# Patient Record
Sex: FEMALE | Race: BLACK OR AFRICAN AMERICAN | NOT HISPANIC OR LATINO | Employment: FULL TIME | URBAN - METROPOLITAN AREA
[De-identification: names, ages, dates, MRNs, and addresses within clinical notes are randomized per-mention and may not be internally consistent; named-entity substitution may affect disease eponyms.]

---

## 2023-06-14 ENCOUNTER — APPOINTMENT (OUTPATIENT)
Dept: PRIMARY CARE | Facility: CLINIC | Age: 28
End: 2023-06-14
Payer: COMMERCIAL

## 2023-06-16 ENCOUNTER — APPOINTMENT (OUTPATIENT)
Dept: PRIMARY CARE | Facility: CLINIC | Age: 28
End: 2023-06-16
Payer: COMMERCIAL

## 2023-06-16 RX ORDER — NAPROXEN SODIUM 220 MG/1
81 TABLET, FILM COATED ORAL DAILY
COMMUNITY
Start: 2020-09-04

## 2023-06-22 PROBLEM — O99.013 ANEMIA AFFECTING PREGNANCY IN THIRD TRIMESTER (HHS-HCC): Status: ACTIVE | Noted: 2023-06-22

## 2023-06-22 PROBLEM — O14.90 PRE-ECLAMPSIA (HHS-HCC): Status: ACTIVE | Noted: 2023-06-22

## 2023-06-22 PROBLEM — O09.43 HIGH RISK MULTIGRAVIDA IN THIRD TRIMESTER (HHS-HCC): Status: ACTIVE | Noted: 2023-06-22

## 2023-06-22 PROBLEM — O23.41 URINARY TRACT INFECTION IN MOTHER DURING FIRST TRIMESTER OF PREGNANCY (HHS-HCC): Status: ACTIVE | Noted: 2023-06-22

## 2023-06-22 PROBLEM — M25.569 KNEE PAIN: Status: ACTIVE | Noted: 2023-06-22

## 2023-06-22 PROBLEM — O35.9XX0 SUSPECTED FETAL ANOMALY, ANTEPARTUM (HHS-HCC): Status: ACTIVE | Noted: 2023-06-22

## 2023-06-22 PROBLEM — R87.810 ASCUS WITH POSITIVE HIGH RISK HPV CERVICAL: Status: ACTIVE | Noted: 2023-06-22

## 2023-06-22 PROBLEM — R87.610 ASCUS WITH POSITIVE HIGH RISK HPV CERVICAL: Status: ACTIVE | Noted: 2023-06-22

## 2023-10-06 ENCOUNTER — APPOINTMENT (OUTPATIENT)
Dept: PRIMARY CARE | Facility: CLINIC | Age: 28
End: 2023-10-06
Payer: COMMERCIAL

## 2024-04-26 ENCOUNTER — APPOINTMENT (OUTPATIENT)
Dept: PRIMARY CARE | Facility: CLINIC | Age: 29
End: 2024-04-26
Payer: COMMERCIAL

## 2024-04-30 ENCOUNTER — OFFICE VISIT (OUTPATIENT)
Dept: PRIMARY CARE | Facility: CLINIC | Age: 29
End: 2024-04-30
Payer: COMMERCIAL

## 2024-04-30 VITALS
HEIGHT: 67 IN | SYSTOLIC BLOOD PRESSURE: 100 MMHG | DIASTOLIC BLOOD PRESSURE: 60 MMHG | BODY MASS INDEX: 18.83 KG/M2 | OXYGEN SATURATION: 99 % | HEART RATE: 79 BPM | WEIGHT: 120 LBS

## 2024-04-30 DIAGNOSIS — R53.83 FATIGUE, UNSPECIFIED TYPE: Primary | ICD-10-CM

## 2024-04-30 DIAGNOSIS — D50.9 IRON DEFICIENCY ANEMIA, UNSPECIFIED IRON DEFICIENCY ANEMIA TYPE: ICD-10-CM

## 2024-04-30 PROBLEM — O09.43 HIGH RISK MULTIGRAVIDA IN THIRD TRIMESTER (HHS-HCC): Status: RESOLVED | Noted: 2023-06-22 | Resolved: 2024-04-30

## 2024-04-30 PROBLEM — O14.90 PRE-ECLAMPSIA (HHS-HCC): Status: RESOLVED | Noted: 2023-06-22 | Resolved: 2024-04-30

## 2024-04-30 PROBLEM — O23.41 URINARY TRACT INFECTION IN MOTHER DURING FIRST TRIMESTER OF PREGNANCY (HHS-HCC): Status: RESOLVED | Noted: 2023-06-22 | Resolved: 2024-04-30

## 2024-04-30 PROBLEM — O35.9XX0 SUSPECTED FETAL ANOMALY, ANTEPARTUM (HHS-HCC): Status: RESOLVED | Noted: 2023-06-22 | Resolved: 2024-04-30

## 2024-04-30 PROBLEM — O99.013 ANEMIA AFFECTING PREGNANCY IN THIRD TRIMESTER (HHS-HCC): Status: RESOLVED | Noted: 2023-06-22 | Resolved: 2024-04-30

## 2024-04-30 LAB
25(OH)D3 SERPL-MCNC: 23 NG/ML (ref 30–100)
ALBUMIN SERPL BCP-MCNC: 4.3 G/DL (ref 3.4–5)
ALP SERPL-CCNC: 61 U/L (ref 33–110)
ALT SERPL W P-5'-P-CCNC: 12 U/L (ref 7–45)
ANION GAP SERPL CALC-SCNC: 11 MMOL/L (ref 10–20)
AST SERPL W P-5'-P-CCNC: 14 U/L (ref 9–39)
BASOPHILS # BLD AUTO: 0.03 X10*3/UL (ref 0–0.1)
BASOPHILS NFR BLD AUTO: 0.3 %
BILIRUB SERPL-MCNC: 0.3 MG/DL (ref 0–1.2)
BUN SERPL-MCNC: 13 MG/DL (ref 6–23)
CALCIUM SERPL-MCNC: 9.3 MG/DL (ref 8.6–10.6)
CHLORIDE SERPL-SCNC: 104 MMOL/L (ref 98–107)
CO2 SERPL-SCNC: 26 MMOL/L (ref 21–32)
CREAT SERPL-MCNC: 0.63 MG/DL (ref 0.5–1.05)
EGFRCR SERPLBLD CKD-EPI 2021: >90 ML/MIN/1.73M*2
EOSINOPHIL # BLD AUTO: 0.14 X10*3/UL (ref 0–0.7)
EOSINOPHIL NFR BLD AUTO: 1.2 %
ERYTHROCYTE [DISTWIDTH] IN BLOOD BY AUTOMATED COUNT: 14.6 % (ref 11.5–14.5)
FERRITIN SERPL-MCNC: 100 NG/ML (ref 8–150)
GLUCOSE SERPL-MCNC: 99 MG/DL (ref 74–99)
HCT VFR BLD AUTO: 42.2 % (ref 36–46)
HGB BLD-MCNC: 13.4 G/DL (ref 12–16)
IMM GRANULOCYTES # BLD AUTO: 0.04 X10*3/UL (ref 0–0.7)
IMM GRANULOCYTES NFR BLD AUTO: 0.4 % (ref 0–0.9)
IRON SATN MFR SERPL: 33 % (ref 25–45)
IRON SERPL-MCNC: 95 UG/DL (ref 35–150)
LYMPHOCYTES # BLD AUTO: 2.53 X10*3/UL (ref 1.2–4.8)
LYMPHOCYTES NFR BLD AUTO: 22.4 %
MCH RBC QN AUTO: 27 PG (ref 26–34)
MCHC RBC AUTO-ENTMCNC: 31.8 G/DL (ref 32–36)
MCV RBC AUTO: 85 FL (ref 80–100)
MONOCYTES # BLD AUTO: 0.82 X10*3/UL (ref 0.1–1)
MONOCYTES NFR BLD AUTO: 7.3 %
NEUTROPHILS # BLD AUTO: 7.73 X10*3/UL (ref 1.2–7.7)
NEUTROPHILS NFR BLD AUTO: 68.4 %
NRBC BLD-RTO: 0 /100 WBCS (ref 0–0)
PLATELET # BLD AUTO: 340 X10*3/UL (ref 150–450)
POTASSIUM SERPL-SCNC: 4.4 MMOL/L (ref 3.5–5.3)
PROT SERPL-MCNC: 6.9 G/DL (ref 6.4–8.2)
RBC # BLD AUTO: 4.97 X10*6/UL (ref 4–5.2)
SODIUM SERPL-SCNC: 137 MMOL/L (ref 136–145)
TIBC SERPL-MCNC: 292 UG/DL (ref 240–445)
TSH SERPL-ACNC: 1.23 MIU/L (ref 0.44–3.98)
UIBC SERPL-MCNC: 197 UG/DL (ref 110–370)
VIT B12 SERPL-MCNC: 447 PG/ML (ref 211–911)
WBC # BLD AUTO: 11.3 X10*3/UL (ref 4.4–11.3)

## 2024-04-30 PROCEDURE — 85025 COMPLETE CBC W/AUTO DIFF WBC: CPT

## 2024-04-30 PROCEDURE — 82306 VITAMIN D 25 HYDROXY: CPT

## 2024-04-30 PROCEDURE — 36415 COLL VENOUS BLD VENIPUNCTURE: CPT

## 2024-04-30 PROCEDURE — 84443 ASSAY THYROID STIM HORMONE: CPT

## 2024-04-30 PROCEDURE — 80053 COMPREHEN METABOLIC PANEL: CPT

## 2024-04-30 PROCEDURE — 83550 IRON BINDING TEST: CPT

## 2024-04-30 PROCEDURE — 82728 ASSAY OF FERRITIN: CPT

## 2024-04-30 PROCEDURE — 99203 OFFICE O/P NEW LOW 30 MIN: CPT | Performed by: FAMILY MEDICINE

## 2024-04-30 PROCEDURE — 86038 ANTINUCLEAR ANTIBODIES: CPT

## 2024-04-30 PROCEDURE — 82607 VITAMIN B-12: CPT

## 2024-04-30 PROCEDURE — 83540 ASSAY OF IRON: CPT

## 2024-04-30 ASSESSMENT — ENCOUNTER SYMPTOMS
OCCASIONAL FEELINGS OF UNSTEADINESS: 0
DEPRESSION: 0
LOSS OF SENSATION IN FEET: 0

## 2024-04-30 ASSESSMENT — PATIENT HEALTH QUESTIONNAIRE - PHQ9
2. FEELING DOWN, DEPRESSED OR HOPELESS: NOT AT ALL
1. LITTLE INTEREST OR PLEASURE IN DOING THINGS: NOT AT ALL
SUM OF ALL RESPONSES TO PHQ9 QUESTIONS 1 AND 2: 0

## 2024-04-30 NOTE — PROGRESS NOTES
"Subjective   Patient ID: Jose Lainez is a 29 y.o. female who presents for New Patient Visit.    HPI   Has history of anemia during pregnancy and was told that she had an issue (not sure what) and was recommended to follow up with PCP.  Feeling foggy, tired and cold recently.  No obvious cause, happened rapidly.    Review of Systems  Negative unless noted in HPI    Objective   /60 (BP Location: Right arm, Patient Position: Sitting, BP Cuff Size: Adult)   Pulse 79   Ht 1.702 m (5' 7\")   Wt 54.4 kg (120 lb)   LMP  (LMP Unknown)   SpO2 99%   BMI 18.79 kg/m²     Physical Exam  Cardiovascular:      Rate and Rhythm: Normal rate and regular rhythm.   Neurological:      General: No focal deficit present.      Mental Status: She is alert.   Psychiatric:         Mood and Affect: Mood normal.         Assessment/Plan   Problem List Items Addressed This Visit             ICD-10-CM    Fatigue - Primary R53.83     Will assess labs  No clear underlying issue noted on chart review including hematology during last pregnancy  Discussed ways to help with fatigue including regular meals, sleep, exercise  Reassess pending lab results         Relevant Orders    Comprehensive Metabolic Panel (Completed)    TSH with reflex to Free T4 if abnormal (Completed)    CBC and Auto Differential (Completed)    Iron and TIBC (Completed)    Ferritin (Completed)    Vitamin D 25-Hydroxy,Total (for eval of Vitamin D levels) (Completed)    Vitamin B12 (Completed)    SANDRO with Reflex to MACRINA     Other Visit Diagnoses         Codes    Iron deficiency anemia, unspecified iron deficiency anemia type     D50.9    Relevant Orders    Comprehensive Metabolic Panel (Completed)    TSH with reflex to Free T4 if abnormal (Completed)    CBC and Auto Differential (Completed)    Iron and TIBC (Completed)    Ferritin (Completed)    Vitamin D 25-Hydroxy,Total (for eval of Vitamin D levels) (Completed)    Vitamin B12 (Completed)    SANDRO with Reflex to MACRINA      "

## 2024-04-30 NOTE — ASSESSMENT & PLAN NOTE
Will assess labs  No clear underlying issue noted on chart review including hematology during last pregnancy  Discussed ways to help with fatigue including regular meals, sleep, exercise  Reassess pending lab results

## 2024-05-01 LAB — ANA SER QL HEP2 SUBST: NEGATIVE

## 2024-06-03 ENCOUNTER — TELEPHONE (OUTPATIENT)
Dept: PRIMARY CARE | Facility: CLINIC | Age: 29
End: 2024-06-03
Payer: COMMERCIAL

## 2024-06-03 NOTE — TELEPHONE ENCOUNTER
Patient last seen 4/30 she states she has left several messages for a call back to discuss her results, she states her situation has gotten worse    Please advise

## 2024-06-04 DIAGNOSIS — R06.02 SOB (SHORTNESS OF BREATH): Primary | ICD-10-CM

## 2024-09-23 ENCOUNTER — HOSPITAL ENCOUNTER (EMERGENCY)
Facility: HOSPITAL | Age: 29
Discharge: HOME | End: 2024-09-23
Payer: COMMERCIAL

## 2024-09-23 VITALS
WEIGHT: 121 LBS | RESPIRATION RATE: 18 BRPM | HEART RATE: 78 BPM | DIASTOLIC BLOOD PRESSURE: 73 MMHG | BODY MASS INDEX: 18.99 KG/M2 | TEMPERATURE: 98.2 F | SYSTOLIC BLOOD PRESSURE: 118 MMHG | HEIGHT: 67 IN | OXYGEN SATURATION: 100 %

## 2024-09-23 DIAGNOSIS — K04.7 DENTAL ABSCESS: Primary | ICD-10-CM

## 2024-09-23 PROCEDURE — 10060 I&D ABSCESS SIMPLE/SINGLE: CPT | Performed by: PHYSICIAN ASSISTANT

## 2024-09-23 PROCEDURE — 99283 EMERGENCY DEPT VISIT LOW MDM: CPT | Mod: 25

## 2024-09-23 PROCEDURE — 99284 EMERGENCY DEPT VISIT MOD MDM: CPT | Performed by: PHYSICIAN ASSISTANT

## 2024-09-23 PROCEDURE — 41800 DRAINAGE OF GUM LESION: CPT | Performed by: PHYSICIAN ASSISTANT

## 2024-09-23 PROCEDURE — 2500000005 HC RX 250 GENERAL PHARMACY W/O HCPCS: Mod: SE | Performed by: PHYSICIAN ASSISTANT

## 2024-09-23 PROCEDURE — 2500000001 HC RX 250 WO HCPCS SELF ADMINISTERED DRUGS (ALT 637 FOR MEDICARE OP): Mod: SE | Performed by: PHYSICIAN ASSISTANT

## 2024-09-23 RX ORDER — AMOXICILLIN AND CLAVULANATE POTASSIUM 875; 125 MG/1; MG/1
1 TABLET, FILM COATED ORAL 2 TIMES DAILY
Qty: 14 TABLET | Refills: 0 | Status: SHIPPED | OUTPATIENT
Start: 2024-09-23 | End: 2024-09-30

## 2024-09-23 RX ORDER — OXYCODONE AND ACETAMINOPHEN 5; 325 MG/1; MG/1
1 TABLET ORAL EVERY 6 HOURS PRN
Qty: 12 TABLET | Refills: 0 | Status: SHIPPED | OUTPATIENT
Start: 2024-09-23 | End: 2024-09-26

## 2024-09-23 RX ORDER — AMOXICILLIN AND CLAVULANATE POTASSIUM 875; 125 MG/1; MG/1
1 TABLET, FILM COATED ORAL ONCE
Status: COMPLETED | OUTPATIENT
Start: 2024-09-23 | End: 2024-09-23

## 2024-09-23 RX ORDER — IBUPROFEN 600 MG/1
600 TABLET ORAL EVERY 6 HOURS PRN
Qty: 20 TABLET | Refills: 0 | Status: SHIPPED | OUTPATIENT
Start: 2024-09-23 | End: 2024-09-28

## 2024-09-23 ASSESSMENT — COLUMBIA-SUICIDE SEVERITY RATING SCALE - C-SSRS
2. HAVE YOU ACTUALLY HAD ANY THOUGHTS OF KILLING YOURSELF?: NO
6. HAVE YOU EVER DONE ANYTHING, STARTED TO DO ANYTHING, OR PREPARED TO DO ANYTHING TO END YOUR LIFE?: NO
1. IN THE PAST MONTH, HAVE YOU WISHED YOU WERE DEAD OR WISHED YOU COULD GO TO SLEEP AND NOT WAKE UP?: NO

## 2024-09-23 ASSESSMENT — PAIN SCALES - GENERAL: PAINLEVEL_OUTOF10: 7

## 2024-09-23 ASSESSMENT — PAIN - FUNCTIONAL ASSESSMENT: PAIN_FUNCTIONAL_ASSESSMENT: 0-10

## 2024-09-23 ASSESSMENT — PAIN DESCRIPTION - PAIN TYPE: TYPE: ACUTE PAIN

## 2024-09-23 ASSESSMENT — PAIN DESCRIPTION - LOCATION: LOCATION: JAW

## 2024-09-23 NOTE — Clinical Note
Jose Lainez was seen and treated in our emergency department on 9/23/2024.  She may return to work on 09/25/2024.       If you have any questions or concerns, please don't hesitate to call.      Sujey Olguin PA-C

## 2024-09-24 NOTE — ED PROVIDER NOTES
"This is a 29-year-old female with no significant past medical history presents to the ED with right upper dental pain that began this morning.  She states that she had a broken tooth that she had seen a dentist for previously, however missed her appointment to have the remainder of the tooth pulled.  She says that she did not have any problems with this tooth until today.  She is endorsing dental pain as well as swelling.  She denies any fevers or chills.  Her pain is worse with touch and attempting to eat.  She has been able to swallow.  She denies any fevers or chills.  She did not take any medications at home for her pain but did use a hot compress with some relief.  She states otherwise she has been in her normal state of health.      History provided by:  Patient   used: No             Visit Vitals  /73   Pulse 78   Temp 36.8 °C (98.2 °F) (Tympanic)   Resp 18   Ht 1.702 m (5' 7\")   Wt 54.9 kg (121 lb)   SpO2 100%   BMI 18.95 kg/m²   OB Status Implant   Smoking Status Former   BSA 1.61 m²          Physical Exam     Physical exam:   General: Vitals noted, no distress. Afebrile.   EENT:  Hearing grossly intact. Normal phonation. MMM. Airway patient. PERRL. EOMI. broken right upper molar with tender to palpation over the remainder of the tooth.  There is soft tissue swelling to the gingiva surrounding this tooth as well as to the right upper buccal area.  There is erythema to the gingiva surrounding this tooth as well.  No evidence of Adam angina.  No trismus.  Tolerating own secretions.  Normal phonation.  Neck: No midline tenderness or paraspinal tenderness. FROM.   Cardiac: Regular, rate, rhythm. Normal S1 and S2.  No murmurs, gallops, rubs.   Pulmonary: Good air exchange. Lungs clear bilaterally. No wheezes, rhonchi, rales. No accessory muscle use.   Extremities: No peripheral edema.  Full range of motion. Moves all extremities freely.   Skin: No rash. Warm and Dry.   Neuro: No focal " neurologic deficits. CN 2-12 grossly intact. Sensation equal bilaterally. No weakness.         Labs Reviewed - No data to display    No orders to display           ED Course & MDM     Medical Decision Making  This is a 29-year-old female who presents to the ED with right upper dental pain for the past 1 day with associated soft tissue swelling.  Vital stable upon arrival to the ED.  On physical examination patient is tolerating her own secretions.  No trismus.  Normal phonation.  No evidence of Adam angina.  She did have soft tissue swelling to the right upper buccal area as well as to the right upper gingiva around one of her molars that was broken.  Exam is concerning for developing dental abscess.  Patient was given Augmentin for this and the area of soft tissue swelling was anesthetized with Cetacaine spray.  I&D attempted with only minimal drainage.  Patient states that she did call her dentist and is able to see them tomorrow.  She is advised to keep this dental appointment.  [facility] report was checked which showed no recent opiate prescriptions.  She was written prescriptions for Percocet, Augmentin, and ibuprofen for her pain at home.  She was given signs and symptoms that she should return to the emergency department with.  She was advised to follow-up with her dentist tomorrow as previously planned.  She had no further questions at this time and was discharged from the ED in stable condition.    Risk  Prescription drug management.         Diagnoses as of 09/23/24 2239   Dental abscess       Patient was seen independently    Incision and Drainage    Performed by: Sujey Olguin PA-C  Authorized by: Sujey Olguin PA-C    Consent:     Consent obtained:  Verbal    Consent given by:  Patient    Risks, benefits, and alternatives were discussed: yes      Risks discussed:  Bleeding, incomplete drainage, pain and infection    Alternatives discussed:  No treatment  Universal protocol:     Procedure explained  and questions answered to patient or proxy's satisfaction: yes      Relevant documents present and verified: yes      Required blood products, implants, devices, and special equipment available: yes      Patient identity confirmed:  Verbally with patient  Location:     Type:  Abscess    Location:  Mouth  Sedation:     Sedation type:  None  Anesthesia:     Anesthesia method:  Topical application    Topical anesthesia: cetacaine spray.  Procedure type:     Complexity:  Simple  Procedure details:     Ultrasound guidance: no      Needle aspiration: yes      Needle size:  18 G    Drainage:  Bloody    Drainage amount:  Scant    Wound treatment:  Wound left open    Packing materials:  None  Post-procedure details:     Procedure completion:  Tolerated well, no immediate complications      TUCKER Berry PA-C     Sujey Olguin PA-C  09/23/24 1477

## 2025-01-30 ENCOUNTER — APPOINTMENT (OUTPATIENT)
Dept: RADIOLOGY | Facility: HOSPITAL | Age: 30
End: 2025-01-30
Payer: COMMERCIAL

## 2025-02-11 ENCOUNTER — TELEPHONE (OUTPATIENT)
Dept: DERMATOLOGY | Facility: CLINIC | Age: 30
End: 2025-02-11
Payer: COMMERCIAL

## 2025-02-11 NOTE — TELEPHONE ENCOUNTER
Dear SALINA BARAJAS:     We have tried reaching you regarding your appointment on  THURSDAY, JUNE 26TH in the department of Dermatology.     The physician will be out of the office and your appointment date/time has been changed from  THURSDAY, JUNE 26TH TO WEDNESDAY JUNE 25TH AT 11 AM .  A message was left for you regarding this change, but in the event that message wasn’t received, we wanted to also send this letter to make sure you receive this information.     If this new appointment date/time is convenient for you, then you do not have to do anything.  If this date/time is not convenient for you, please contact the scheduling office to reschedule, at [phone].      Thank you,  [facility] Department of Dermatology

## 2025-03-03 ENCOUNTER — HOSPITAL ENCOUNTER (EMERGENCY)
Facility: HOSPITAL | Age: 30
Discharge: HOME | End: 2025-03-03
Attending: EMERGENCY MEDICINE
Payer: COMMERCIAL

## 2025-03-03 ENCOUNTER — APPOINTMENT (OUTPATIENT)
Dept: RADIOLOGY | Facility: HOSPITAL | Age: 30
End: 2025-03-03
Payer: COMMERCIAL

## 2025-03-03 VITALS
TEMPERATURE: 98.2 F | DIASTOLIC BLOOD PRESSURE: 69 MMHG | HEART RATE: 84 BPM | OXYGEN SATURATION: 99 % | RESPIRATION RATE: 16 BRPM | SYSTOLIC BLOOD PRESSURE: 131 MMHG

## 2025-03-03 DIAGNOSIS — M25.562 ACUTE PAIN OF LEFT KNEE: Primary | ICD-10-CM

## 2025-03-03 PROCEDURE — 73564 X-RAY EXAM KNEE 4 OR MORE: CPT | Mod: LT

## 2025-03-03 PROCEDURE — 2500000001 HC RX 250 WO HCPCS SELF ADMINISTERED DRUGS (ALT 637 FOR MEDICARE OP): Mod: SE | Performed by: STUDENT IN AN ORGANIZED HEALTH CARE EDUCATION/TRAINING PROGRAM

## 2025-03-03 PROCEDURE — 73564 X-RAY EXAM KNEE 4 OR MORE: CPT | Mod: LEFT SIDE | Performed by: RADIOLOGY

## 2025-03-03 PROCEDURE — 99283 EMERGENCY DEPT VISIT LOW MDM: CPT | Performed by: EMERGENCY MEDICINE

## 2025-03-03 RX ORDER — IBUPROFEN 400 MG/1
400 TABLET ORAL ONCE
Status: COMPLETED | OUTPATIENT
Start: 2025-03-03 | End: 2025-03-03

## 2025-03-03 RX ADMIN — IBUPROFEN 400 MG: 400 TABLET ORAL at 20:01

## 2025-03-03 ASSESSMENT — COLUMBIA-SUICIDE SEVERITY RATING SCALE - C-SSRS
6. HAVE YOU EVER DONE ANYTHING, STARTED TO DO ANYTHING, OR PREPARED TO DO ANYTHING TO END YOUR LIFE?: NO
1. IN THE PAST MONTH, HAVE YOU WISHED YOU WERE DEAD OR WISHED YOU COULD GO TO SLEEP AND NOT WAKE UP?: NO
2. HAVE YOU ACTUALLY HAD ANY THOUGHTS OF KILLING YOURSELF?: NO

## 2025-03-04 NOTE — ED PROVIDER NOTES
Emergency Department Provider Note        History of Present Illness     History provided by: Patient  Limitations to History: None  External Records Reviewed with Brief Summary: None    HPI:  Jose Lainez is a 29 y.o. female presenting to the emergency department for left knee pain.  Patient notes that she tripped over her child's toy and fell and hit the corner of her bed 3 days ago.  Since that time she has had knee swelling and pain on ambulation.  She tried taking ibuprofen and Tylenol without much relief.  She wants to check to make sure nothing is broken.    Physical Exam   Triage vitals:  T 36.8 °C (98.2 °F)  HR 84  /69  RR 16  O2 99 %      Physical Exam  Constitutional:       General: She is not in acute distress.     Appearance: Normal appearance. She is not ill-appearing.   HENT:      Head: Normocephalic and atraumatic.      Right Ear: External ear normal.      Left Ear: External ear normal.      Mouth/Throat:      Mouth: Mucous membranes are moist.   Eyes:      General:         Right eye: No discharge.         Left eye: No discharge.   Cardiovascular:      Rate and Rhythm: Normal rate.   Pulmonary:      Effort: Pulmonary effort is normal. No respiratory distress.   Musculoskeletal:         General: Normal range of motion.      Comments: Trace soft tissue swelling of left knee, tenderness palpation over lateral knee.  Patient able to ambulate without difficulty.   Skin:     General: Skin is warm and dry.      Coloration: Skin is not jaundiced.   Neurological:      Mental Status: She is alert and oriented to person, place, and time.   Psychiatric:         Mood and Affect: Mood normal.         Behavior: Behavior normal.          Medical Decision Making & ED Course   Medical Decision Makin y.o. female Presenting as per HPI, differential is broad and includes but not limited to bone contusion, soft tissue swelling, fracture, dislocation.   As a result, workup was ordered targeting these  diagnoses including Xrays.  Workup came back remarkable for no signs of fracture or dislocation.  As a result of this, with further discussion of the overall condition with the patient.  Through shared decision making the final disposition was determined to be discharged home with reassurance.   ----    Differential diagnoses considered include but are not limited to: As per [facility]    Independent Result Review and Interpretation:  I personally reviewed the x-ray of the knee, no signs of fracture or dislocation    Care Considerations: As documented above in [facility]    ED Course:  Diagnoses as of 03/04/25 0147   Acute pain of left knee     Disposition   As a result of the work-up, the patient was discharged home.  she was informed of her diagnosis and instructed to come back with any concerns or worsening of condition.  she and was agreeable to the plan as discussed above.  she was given the opportunity to ask questions.  All of the patient's questions were answered.    Procedures   Procedures    Jose Luis Meyer DO  Emergency Medicine      Jose Luis Meyer DO  Resident  03/04/25 0148

## 2025-04-13 ENCOUNTER — HOSPITAL ENCOUNTER (EMERGENCY)
Facility: HOSPITAL | Age: 30
Discharge: HOME | End: 2025-04-13
Payer: COMMERCIAL

## 2025-04-13 VITALS
DIASTOLIC BLOOD PRESSURE: 70 MMHG | RESPIRATION RATE: 18 BRPM | WEIGHT: 121 LBS | SYSTOLIC BLOOD PRESSURE: 137 MMHG | BODY MASS INDEX: 18.99 KG/M2 | TEMPERATURE: 97.7 F | HEIGHT: 67 IN | OXYGEN SATURATION: 100 % | HEART RATE: 87 BPM

## 2025-04-13 DIAGNOSIS — B96.89 BV (BACTERIAL VAGINOSIS): Primary | ICD-10-CM

## 2025-04-13 DIAGNOSIS — N76.0 BV (BACTERIAL VAGINOSIS): Primary | ICD-10-CM

## 2025-04-13 LAB
APPEARANCE UR: CLEAR
BILIRUB UR STRIP.AUTO-MCNC: NEGATIVE MG/DL
CLUE CELLS SPEC QL WET PREP: PRESENT
COLOR UR: YELLOW
GLUCOSE UR STRIP.AUTO-MCNC: NORMAL MG/DL
KETONES UR STRIP.AUTO-MCNC: NEGATIVE MG/DL
LEUKOCYTE ESTERASE UR QL STRIP.AUTO: NEGATIVE
MUCOUS THREADS #/AREA URNS AUTO: ABNORMAL /LPF
NITRITE UR QL STRIP.AUTO: NEGATIVE
PH UR STRIP.AUTO: 6.5 [PH]
PREGNANCY TEST URINE, POC: NEGATIVE
PROT UR STRIP.AUTO-MCNC: ABNORMAL MG/DL
RBC # UR STRIP.AUTO: NEGATIVE MG/DL
RBC #/AREA URNS AUTO: ABNORMAL /HPF
SP GR UR STRIP.AUTO: 1.03
SQUAMOUS #/AREA URNS AUTO: ABNORMAL /HPF
T VAGINALIS SPEC QL WET PREP: ABNORMAL
TRICHOMONAS REFLEX COMMENT: ABNORMAL
UROBILINOGEN UR STRIP.AUTO-MCNC: ABNORMAL MG/DL
WBC #/AREA URNS AUTO: ABNORMAL /HPF
WBC VAG QL WET PREP: ABNORMAL
YEAST VAG QL WET PREP: ABNORMAL

## 2025-04-13 PROCEDURE — 81001 URINALYSIS AUTO W/SCOPE: CPT

## 2025-04-13 PROCEDURE — 87210 SMEAR WET MOUNT SALINE/INK: CPT

## 2025-04-13 PROCEDURE — 99283 EMERGENCY DEPT VISIT LOW MDM: CPT

## 2025-04-13 PROCEDURE — 87491 CHLMYD TRACH DNA AMP PROBE: CPT

## 2025-04-13 PROCEDURE — 87661 TRICHOMONAS VAGINALIS AMPLIF: CPT

## 2025-04-13 PROCEDURE — 81025 URINE PREGNANCY TEST: CPT

## 2025-04-13 PROCEDURE — 99284 EMERGENCY DEPT VISIT MOD MDM: CPT

## 2025-04-13 RX ORDER — METRONIDAZOLE 500 MG/1
500 TABLET ORAL 2 TIMES DAILY
Qty: 14 TABLET | Refills: 0 | Status: SHIPPED | OUTPATIENT
Start: 2025-04-13 | End: 2025-04-20

## 2025-04-13 ASSESSMENT — PAIN DESCRIPTION - ORIENTATION: ORIENTATION: LOWER

## 2025-04-13 ASSESSMENT — PAIN SCALES - GENERAL: PAINLEVEL_OUTOF10: 4

## 2025-04-13 ASSESSMENT — PAIN DESCRIPTION - PAIN TYPE: TYPE: ACUTE PAIN

## 2025-04-13 ASSESSMENT — PAIN - FUNCTIONAL ASSESSMENT: PAIN_FUNCTIONAL_ASSESSMENT: 0-10

## 2025-04-13 ASSESSMENT — PAIN DESCRIPTION - LOCATION: LOCATION: ABDOMEN

## 2025-04-13 ASSESSMENT — PAIN DESCRIPTION - DESCRIPTORS: DESCRIPTORS: ACHING;CRAMPING

## 2025-04-14 LAB
C TRACH RRNA SPEC QL NAA+PROBE: NEGATIVE
HOLD SPECIMEN: NORMAL
N GONORRHOEA DNA SPEC QL PROBE+SIG AMP: NEGATIVE
T VAGINALIS RRNA SPEC QL NAA+PROBE: NEGATIVE

## 2025-04-14 NOTE — DISCHARGE INSTRUCTIONS
Swabs positive for BV. This is not an STD but it is an imbalance of bacteria in the vaginal canal requiring antibiotics.  Please take Flagyl twice a day until finished.  Do not drink alcohol on this medication as it will make you extremely sick.  Urine negative for UTI

## 2025-04-14 NOTE — ED PROVIDER NOTES
"HPI   Chief Complaint   Patient presents with    Vaginal Discharge    Abdominal Pain       Jose Lainez is a 29 year old female with no pertinent PMHx presenting to the ED for concerns of vaginal discharge and intermittent pelvic cramps. Patient states that she first developed vaginal discharge 1 week ago that has been a \"white cream\" color and has developed a \"sour\" odor. Over the last 3 day she developed intermittent left sided pelvic cramps that last for a few seconds and go away without intervention. Denies nausea or vomiting. Patient states that she does not get regular menstrual periods due to IUD placement. She last had sexual intercourse 2 years ago and is not concerned for STDs or pregnancy. Denies painful urination, urgency, frequency, fever, or chills.               Patient History   Past Medical History:   Diagnosis Date    10 weeks gestation of pregnancy ([facility]) 2020    Pregnancy with 10 completed weeks gestation    32 weeks gestation of pregnancy ([facility]) 2019    32 weeks gestation of pregnancy    Abnormal hematological finding on  screening of mother 2020    High risk pregnancy with low PAPPA    Anemia     Encounter for  screening, unspecified 2020    1st trimester screening    Encounter for care and examination of lactating mother 2020    Postpartum care and examination of lactating mother    Encounter for contraceptive management, unspecified 2020    Contraception management    Encounter for pregnancy test, result negative 2020    Urine pregnancy test negative    Encounter for pregnancy test, result positive ([facility]) 2019    Positive urine pregnancy test    Encounter for screening for infections with a predominantly sexual mode of transmission     Screening examination for STD (sexually transmitted disease)    Encounter for screening for infections with a predominantly sexual mode of transmission 2020    Routine screening " for STI (sexually transmitted infection)    Encounter for supervision of other normal pregnancy, second trimester 2019    Prenatal care, subsequent pregnancy in second trimester    Encounter for supervision of other normal pregnancy, second trimester 10/17/2019    Prenatal care, subsequent pregnancy in second trimester    Encounter for supervision of other normal pregnancy, third trimester 2021    Prenatal care, subsequent pregnancy, third trimester    Encounter for supervision of other normal pregnancy, unspecified trimester ([facility]) 2020    Prenatal care, subsequent pregnancy    Irregular menstruation, unspecified 10/24/2017    Missed period    Maternal care for other (suspected) fetal abnormality and damage, fetal cardiac anomalies, not applicable or unspecified 2020    Abnormal fetal echocardiogram affecting antepartum care of mother    Maternal care for other known or suspected poor fetal growth, unspecified trimester, not applicable or unspecified 2020    IUGR,     Missed  ([facility]) 10/25/2017    Missed     Other problems related to lifestyle     Other problems related to lifestyle    Other specified personal risk factors, not elsewhere classified 2019    At risk of UTI (urinary tract infection)    Personal history of other complications of pregnancy, childbirth and the puerperium 2019    History of pre-eclampsia    Personal history of other infectious and parasitic diseases 2020    History of trichomoniasis    Personal history of other specified conditions 2020    History of dysuria    Unspecified maternal hypertension, unspecified trimester ([facility]) 2020    Hypertension in pregnancy, antepartum    Vomiting of pregnancy, unspecified 2020    Nausea and vomiting in pregnancy     History reviewed. No pertinent surgical history.  No family history on file.  Social History     Tobacco Use    Smoking status: Former      Current packs/day: 0.00     Types: Cigarettes     Quit date: 2020     Years since quittin.9    Smokeless tobacco: Not on file   Substance Use Topics    Alcohol use: Not Currently    Drug use: Not Currently     Types: Marijuana       Physical Exam   ED Triage Vitals [25]   Temperature Heart Rate Respirations BP   36.5 °C (97.7 °F) 87 18 137/70      Pulse Ox Temp Source Heart Rate Source Patient Position   100 % Temporal Monitor Sitting      BP Location FiO2 (%)     Left arm --       Physical Exam  Exam conducted with a chaperone present.   Constitutional:       General: She is not in acute distress.     Appearance: Normal appearance. She is not ill-appearing or toxic-appearing.   HENT:      Head: Normocephalic and atraumatic.   Pulmonary:      Effort: Pulmonary effort is normal.   Abdominal:      Tenderness: There is no abdominal tenderness. There is no guarding.   Genitourinary:     Labia:         Right: No rash or lesion.         Left: No rash or lesion.       Vagina: Vaginal discharge present. No erythema, tenderness, bleeding or lesions.      Cervix: No cervical motion tenderness, discharge or cervical bleeding.      Uterus: Normal.       Adnexa:         Right: No mass or tenderness.          Left: No mass or tenderness.        Rectum: Normal.   Musculoskeletal:         General: Normal range of motion.   Skin:     General: Skin is warm and dry.   Neurological:      General: No focal deficit present.      Mental Status: She is alert and oriented to person, place, and time.   Psychiatric:         Mood and Affect: Mood normal.         Behavior: Behavior normal.           ED Course & MDM   ED Course as of 25 2350   Sun 3 Preg Test, Ur: Negative [ML]   2217 Clue Cells(!): Present [ML]      ED Course User Index  [ML] Jenn Wills PA-C         Diagnoses as of 25   BV (bacterial vaginosis)                 No data recorded     Lejunior Coma Scale Score: 15 (25  2100 : Alvina Vides, TONY)                           Medical Decision Making  29-year-old female presenting today for vaginal discharge.  Abdomen soft, has been experiencing intermittent left-sided pelvic cramping that no true pain or abdominal pain.  She is afebrile, does not appear toxic, low suspicion for PID.   exam performed revealing a small amount of vaginal discharge with odor.  Wet prep and G/C swabs obtained.  UA negative for UTI, pregnancy test negative.  Wet prep positive for BV likely the cause of symptoms.  Low suspicion for G/C as she has not been sexually active for 2 years so will defer empiric treatment.  Sent in prescriptions for Flagyl to treat the BV.        Procedure  Procedures     Jenn Wills PA-C  04/13/25 3121       Jenn Wills PA-C  04/13/25 8400

## 2025-04-14 NOTE — ED TRIAGE NOTES
Pt presents to the ED d/t vaginal discharge and lower abdominal pain. Pt states this has been going on for the last three to five days. Pt denies any other issues at this time. Pt is AxOx4/VSS.

## 2025-06-01 ENCOUNTER — HOSPITAL ENCOUNTER (EMERGENCY)
Facility: HOSPITAL | Age: 30
Discharge: HOME | End: 2025-06-01
Attending: EMERGENCY MEDICINE
Payer: COMMERCIAL

## 2025-06-01 VITALS
HEIGHT: 67 IN | SYSTOLIC BLOOD PRESSURE: 147 MMHG | HEART RATE: 110 BPM | BODY MASS INDEX: 18.83 KG/M2 | DIASTOLIC BLOOD PRESSURE: 90 MMHG | TEMPERATURE: 98.4 F | OXYGEN SATURATION: 97 % | WEIGHT: 120 LBS | RESPIRATION RATE: 17 BRPM

## 2025-06-01 DIAGNOSIS — N64.4 PAIN OF LEFT BREAST: Primary | ICD-10-CM

## 2025-06-01 PROCEDURE — 99282 EMERGENCY DEPT VISIT SF MDM: CPT | Performed by: EMERGENCY MEDICINE

## 2025-06-01 PROCEDURE — 99281 EMR DPT VST MAYX REQ PHY/QHP: CPT

## 2025-06-01 PROCEDURE — 99283 EMERGENCY DEPT VISIT LOW MDM: CPT | Performed by: EMERGENCY MEDICINE

## 2025-06-01 ASSESSMENT — PAIN SCALES - GENERAL: PAINLEVEL_OUTOF10: 5 - MODERATE PAIN

## 2025-06-01 ASSESSMENT — PAIN - FUNCTIONAL ASSESSMENT: PAIN_FUNCTIONAL_ASSESSMENT: 0-10

## 2025-06-01 NOTE — ED PROVIDER NOTES
EMERGENCY DEPARTMENT ENCOUNTER      Pt Name: Jose Lainez  MRN: [id number]  Birthdate 1995  Date of evaluation: 6/1/2025  Provider: Michael Calderón DO    CHIEF COMPLAINT       Chief Complaint   Patient presents with    Breast Pain         HISTORY OF PRESENT ILLNESS    HPI    30-year-old female presenting to the emerged department for evaluation of left breast pain and painful lump in the lower left breast.  Patient states she has had intermittent pain in her left breast in the past however her current episode started a week ago.  States today she was massaging her breast which has helped with the pain in the past and noticed a small lump and grew concerned and wanted to be evaluated.  No family history of breast cancer.  No fevers, chills, or night sweats.  Patient has not noticed any redness of the breast or swelling.    Nursing Notes were reviewed.    PAST MEDICAL HISTORY   Medical History[1]      SURGICAL HISTORY     Surgical History[2]      CURRENT MEDICATIONS       Previous Medications    ASPIRIN 81 MG CHEWABLE TABLET    Chew 1 tablet (81 mg) once daily.    FLUCONAZOLE (DIFLUCAN) 150 MG TABLET    TAKE ONE TABLET AFTER COMPLETING METRONIDAZOLE IF NEEDED.    IBUPROFEN 600 MG TABLET    Take by mouth every 6 hours.    PRENATAL VIT NO.126-IRON-FOLIC (CLASSIC PRENATAL) 28 MG IRON- 800 MCG TABLET    Take 1 tablet by mouth once daily.       ALLERGIES     Patient has no known allergies.    FAMILY HISTORY     Family History[3]       SOCIAL HISTORY     Social History[4]    SCREENINGS                        PHYSICAL EXAM    (up to 7 for level 4, 8 or more for level 5)     ED Triage Vitals [06/01/25 1342]   Temperature Heart Rate Respirations BP   36.9 °C (98.4 °F) (!) 110 17 147/90      Pulse Ox Temp Source Heart Rate Source Patient Position   97 % Temporal Monitor Sitting      BP Location FiO2 (%)     Left arm --       Physical Exam  Vitals and nursing note reviewed. Exam conducted with a chaperone present.    Constitutional:       General: She is not in acute distress.     Appearance: Normal appearance. She is not ill-appearing, toxic-appearing or diaphoretic.   HENT:      Head: Normocephalic and atraumatic.      Right Ear: External ear normal.      Left Ear: External ear normal.      Nose: Nose normal.      Mouth/Throat:      Pharynx: Oropharynx is clear.   Eyes:      Conjunctiva/sclera: Conjunctivae normal.   Cardiovascular:      Rate and Rhythm: Normal rate and regular rhythm.      Pulses: Normal pulses.      Heart sounds: Normal heart sounds.   Pulmonary:      Effort: Pulmonary effort is normal.      Breath sounds: Normal breath sounds.   Chest:      Comments: Palpation patient's breast tissue has diffuse lumpy contour without fluctuance, palpable mass, skin dimpling, discharge from the nipple, bleeding, discoloration.  Does have some tenderness to the lower breast on palpation of her one of the larger breast lumps.   Abdominal:      General: Abdomen is flat.      Palpations: Abdomen is soft.   Musculoskeletal:         General: Normal range of motion.      Cervical back: Normal range of motion and neck supple.   Skin:     General: Skin is warm and dry.   Neurological:      General: No focal deficit present.      Mental Status: She is alert and oriented to person, place, and time.   Psychiatric:         Mood and Affect: Mood normal.         Behavior: Behavior normal.          DIAGNOSTIC RESULTS     LABS:  Labs Reviewed - No data to display    All other labs were within normal range or not returned as of this dictation.    Imaging  No orders to display        Procedures  Procedures     EMERGENCY DEPARTMENT COURSE/MDM:     Diagnoses as of 06/01/25 1449   Pain of left breast        Medical Decision Making    30-year-old female presenting to the emerged department for evaluation of left breast pain and painful lump in the lower left breast.  Hemodynamically stable, no acute distress, nontoxic-appearing, afebrile.   History and physical exam most consistent with fibrocystic changes however will discharge patient with ambulatory referral to breast surgery and instructions to follow-up with her OB/GYN.  Discharge with strict return precautions.    Patient and or family in agreement and understanding of treatment plan.  All questions answered.      I reviewed the case with the attending ED physician. The attending ED physician agrees with the plan. Patient and/or patient´s representative was counseled regarding labs, imaging, likely diagnosis, and plan. All questions were answered.    ED Medications administered this visit:  Medications - No data to display    New Prescriptions from this visit:    New Prescriptions    No medications on file       Follow-up:  No follow-up provider specified.      Final Impression:   1. Pain of left breast          (Please note that portions of this note were completed with a voice recognition program.  Efforts were made to edit the dictations but occasionally words are mis-transcribed.)         [1]   Past Medical History:  Diagnosis Date    10 weeks gestation of pregnancy ([facility]) 2020    Pregnancy with 10 completed weeks gestation    32 weeks gestation of pregnancy ([facility]) 2019    32 weeks gestation of pregnancy    Abnormal hematological finding on  screening of mother 2020    High risk pregnancy with low PAPPA    Anemia     Encounter for  screening, unspecified 2020    1st trimester screening    Encounter for care and examination of lactating mother 2020    Postpartum care and examination of lactating mother    Encounter for contraceptive management, unspecified 2020    Contraception management    Encounter for pregnancy test, result negative 2020    Urine pregnancy test negative    Encounter for pregnancy test, result positive ([facility]) 2019    Positive urine pregnancy test    Encounter for screening for infections with a  predominantly sexual mode of transmission     Screening examination for STD (sexually transmitted disease)    Encounter for screening for infections with a predominantly sexual mode of transmission 2020    Routine screening for STI (sexually transmitted infection)    Encounter for supervision of other normal pregnancy, second trimester 2019    Prenatal care, subsequent pregnancy in second trimester    Encounter for supervision of other normal pregnancy, second trimester 10/17/2019    Prenatal care, subsequent pregnancy in second trimester    Encounter for supervision of other normal pregnancy, third trimester 2021    Prenatal care, subsequent pregnancy, third trimester    Encounter for supervision of other normal pregnancy, unspecified trimester ([facility]) 2020    Prenatal care, subsequent pregnancy    Irregular menstruation, unspecified 10/24/2017    Missed period    Maternal care for other (suspected) fetal abnormality and damage, fetal cardiac anomalies, not applicable or unspecified 2020    Abnormal fetal echocardiogram affecting antepartum care of mother    Maternal care for other known or suspected poor fetal growth, unspecified trimester, not applicable or unspecified 2020    IUGR,     Missed  ([facility]) 10/25/2017    Missed     Other problems related to lifestyle     Other problems related to lifestyle    Other specified personal risk factors, not elsewhere classified 2019    At risk of UTI (urinary tract infection)    Personal history of other complications of pregnancy, childbirth and the puerperium 2019    History of pre-eclampsia    Personal history of other infectious and parasitic diseases 2020    History of trichomoniasis    Personal history of other specified conditions 2020    History of dysuria    Unspecified maternal hypertension, unspecified trimester ([facility]) 2020    Hypertension in pregnancy, antepartum     Vomiting of pregnancy, unspecified 2020    Nausea and vomiting in pregnancy   [2] No past surgical history on file.  [3] No family history on file.  [4]   Social History  Socioeconomic History    Marital status: Single   Tobacco Use    Smoking status: Former     Current packs/day: 0.00     Types: Cigarettes     Quit date: 2020     Years since quittin.0   Substance and Sexual Activity    Alcohol use: Not Currently    Drug use: Not Currently     Types: Marijuana    Sexual activity: Not Currently        Michael Calderón DO  Resident  [phone]

## 2025-06-01 NOTE — DISCHARGE INSTRUCTIONS
He will be contacted to schedule follow-up appointment with the breast specialist.  Return to the emergency department daily for any worsening symptoms such as fevers, chills, night sweats, worsening pain, discoloration of your breast.

## 2025-06-19 ENCOUNTER — APPOINTMENT (OUTPATIENT)
Dept: SURGICAL ONCOLOGY | Facility: HOSPITAL | Age: 30
End: 2025-06-19
Payer: COMMERCIAL

## 2025-06-23 NOTE — PROGRESS NOTES
Jose Lainez female   1995 30 y.o.   [id number]      Chief Complaint  New patient, left breast pain    History Of Present Illness  Jose Lainez is a 30 y.o. female presenting with left breast pain.   presented to ED for left breast painful lump. States the pain first developed about a month ago. States the pain is intermittent and only occurred twice. States she is unable to feel the lump anymore. She does smoke.  She denies breast biopsy or surgery. She denies family history breast cancer.    BREAST IMAGIN2025 bilateral diagnostic mammogram and ultrasound BI-RADS Category 2.     REPRODUCTIVE HISTORY: menarche age 10, , first birth age 21, OCP's 0-5 years, premenopausal                                  FAMILY CANCER HISTORY:    No family history breast or ovarian cancer    Surgical History  She has no past surgical history on file.     Social History  She reports that she quit smoking about 5 years ago. Her smoking use included cigarettes. She does not have any smokeless tobacco history on file. She reports that she does not currently use alcohol. She reports that she does not currently use drugs after having used the following drugs: Marijuana.    Family History  Family History[1]     Allergies  Patient has no known allergies.    Medications  Current Outpatient Medications   Medication Instructions    aspirin 81 mg, Daily    fluconazole (Diflucan) 150 mg tablet TAKE ONE TABLET AFTER COMPLETING METRONIDAZOLE IF NEEDED.    ibuprofen 600 mg tablet Every 6 hours    prenatal vit no.126-iron-folic (Classic Prenatal) 28 mg iron- 800 mcg tablet 1 tablet, Daily         REVIEW OF SYSTEMS  Review of Systems - Oncology         Past Medical History  She has a past medical history of 10 weeks gestation of pregnancy ([facility]) (2020), 32 weeks gestation of pregnancy ([facility]) (2019), Abnormal hematological finding on  screening of mother (2020), Anemia, Encounter for   screening, unspecified (2020), Encounter for care and examination of lactating mother (2020), Encounter for contraceptive management, unspecified (2020), Encounter for pregnancy test, result negative (2020), Encounter for pregnancy test, result positive ([facility]) (2019), Encounter for screening for infections with a predominantly sexual mode of transmission, Encounter for screening for infections with a predominantly sexual mode of transmission (2020), Encounter for supervision of other normal pregnancy, second trimester (2019), Encounter for supervision of other normal pregnancy, second trimester (10/17/2019), Encounter for supervision of other normal pregnancy, third trimester (2021), Encounter for supervision of other normal pregnancy, unspecified trimester ([facility]) (2020), Irregular menstruation, unspecified (10/24/2017), Maternal care for other (suspected) fetal abnormality and damage, fetal cardiac anomalies, not applicable or unspecified (2020), Maternal care for other known or suspected poor fetal growth, unspecified trimester, not applicable or unspecified (2020), Missed  ([facility]) (10/25/2017), Other problems related to lifestyle, Other specified personal risk factors, not elsewhere classified (2019), Personal history of other complications of pregnancy, childbirth and the puerperium (2019), Personal history of other infectious and parasitic diseases (2020), Personal history of other specified conditions (2020), Unspecified maternal hypertension, unspecified trimester ([facility]) (2020), and Vomiting of pregnancy, unspecified (2020).     Physical Exam  Patient is alert and oriented x3 and in a relaxed and appropriate mood. Her gait is steady and hand grasps are equal. Sclera is clear. The breasts are nearly symmetrical. The tissue is soft without palpable abnormalities, discrete nodules or masses.  The skin and nipples appear normal. There is no cervical, supraclavicular or axillary lymphadenopathy. Heart rate and rhythm normal, S1 and S2 appreciated. The lungs are clear to auscultation bilaterally. Abdomen is soft and non-tender.       Physical Exam     Last Recorded Vitals  Vitals:    06/26/25 0859   BP: 134/86   Pulse: 89   Resp: 20   Temp: 35.8 °C (96.4 °F)   SpO2: 97%       Relevant Results   Time was spent viewing digital images of the radiology testing with the patient. I explained the results in depth, along with suggested explanation for follow up recommendations based on the testing results. BI-RADS Category 2    Imaging  Interpreted By:  Marshall Palomo,   STUDY:  BI MAMMO BILATERAL DIAGNOSTIC TOMOSYNTHESIS; BI US BREAST LIMITED  LEFT;  6/26/2025 8:39 am; 6/26/2025 8:55 am      ACCESSION NUMBER(S):  [id number]; [id number]      ORDERING CLINICIAN:  JULIAN GILMORE      INDICATION:  Patient presents with a tender palpable abnormality in the lower  inner quadrant of the left breast. At this time of the examination  the patient could not reproduce the clinical finding but could  remember the area.      ,N64.4 Mastodynia,N63.20 Unspecified lump in the left breast,  unspecified quadrant      COMPARISON:  None. Baseline exam.      FINDINGS:  MAMMOGRAPHY: 2D and tomosynthesis images were reviewed at 1 mm slice  thickness.      Density:  The breasts are heterogeneously dense, which may obscure  small masses.      An area of asymmetry was questioned on the right MLO view which  resolves with the additional views. No suspicious masses or  calcifications are identified.      ULTRASOUND: Targeted ultrasound was performed of the left breast by a  registered sonographer with elastography. Sonographic images were  obtained of the left breast in the lower inner quadrant. What is  likely an incidental benign cyst is identified at the 7 o'clock  position 4 cm from the nipple. It measures 0.4 x 0.2 x 0.5 cm.  As  expected it is soft and avascular.      IMPRESSION:  No suspicious findings seen in the area of concern. An incidental  benign cyst is seen. Clinical follow-up can be considered. Otherwise  annual screening is recommended at age 40 unless clinically indicated  earlier.      BI-RADS CATEGORY:  BI-RADS Category:  2 Benign.  Recommendation:  Clinical Follow-up and Continued Annual Screening.  Recommended Date:  Age 40 or based on Risk-Assessment.  Laterality:  Bilateral.       Assessment/Plan     Normal clinical breast exam and imaging, no history breast biopsy or surgery, no family history breast cancer      Patient Discussion/Summary  Your clinical examination and imaging are normal. You no longer need to be seen by a breast specialist for an annual physical breast examination. It is important to continue annual screening mammograms and breast exams through your primary care provider. Please return to see me if you have a new breast problem or abnormal mammogram. It has been a pleasure having you as a patient.     You can see your health information, review clinical summaries from office visits & test results online when you follow your health with MY  Chart, a personal health record. To sign up go to [email or web address]. If you need assistance with signing up or trouble getting into your account call [facility] Patient Line 24/7 at [phone].    My office phone number is [phone] if you need to get in touch with me or have additional questions or concerns. Thank you for choosing [facility] and trusting me as your healthcare provider. I am honored to be a provider on your health care team and I remain dedicated to helping you achieve your health goals.     Nava Vásquez, APRN-CNP         [1] No family history on file.

## 2025-06-25 ENCOUNTER — APPOINTMENT (OUTPATIENT)
Dept: DERMATOLOGY | Facility: CLINIC | Age: 30
End: 2025-06-25
Payer: COMMERCIAL

## 2025-06-26 ENCOUNTER — HOSPITAL ENCOUNTER (OUTPATIENT)
Dept: RADIOLOGY | Facility: HOSPITAL | Age: 30
Discharge: HOME | End: 2025-06-26
Payer: COMMERCIAL

## 2025-06-26 ENCOUNTER — OFFICE VISIT (OUTPATIENT)
Dept: SURGICAL ONCOLOGY | Facility: HOSPITAL | Age: 30
End: 2025-06-26
Payer: COMMERCIAL

## 2025-06-26 ENCOUNTER — APPOINTMENT (OUTPATIENT)
Dept: DERMATOLOGY | Facility: CLINIC | Age: 30
End: 2025-06-26
Payer: COMMERCIAL

## 2025-06-26 VITALS
WEIGHT: 119.8 LBS | RESPIRATION RATE: 20 BRPM | SYSTOLIC BLOOD PRESSURE: 134 MMHG | BODY MASS INDEX: 18.76 KG/M2 | OXYGEN SATURATION: 97 % | DIASTOLIC BLOOD PRESSURE: 86 MMHG | TEMPERATURE: 96.4 F | HEART RATE: 89 BPM

## 2025-06-26 DIAGNOSIS — N63.20 LEFT BREAST LUMP: ICD-10-CM

## 2025-06-26 DIAGNOSIS — N64.4 PAIN OF LEFT BREAST: ICD-10-CM

## 2025-06-26 DIAGNOSIS — N64.4 BREAST PAIN, LEFT: ICD-10-CM

## 2025-06-26 PROCEDURE — 99213 OFFICE O/P EST LOW 20 MIN: CPT | Mod: 25

## 2025-06-26 PROCEDURE — 76642 ULTRASOUND BREAST LIMITED: CPT | Mod: LT

## 2025-06-26 PROCEDURE — 77062 BREAST TOMOSYNTHESIS BI: CPT | Performed by: RADIOLOGY

## 2025-06-26 PROCEDURE — 77066 DX MAMMO INCL CAD BI: CPT

## 2025-06-26 PROCEDURE — 77066 DX MAMMO INCL CAD BI: CPT | Performed by: RADIOLOGY

## 2025-06-26 PROCEDURE — 99203 OFFICE O/P NEW LOW 30 MIN: CPT

## 2025-06-26 PROCEDURE — 76642 ULTRASOUND BREAST LIMITED: CPT | Performed by: RADIOLOGY

## 2025-06-26 PROCEDURE — 76982 USE 1ST TARGET LESION: CPT | Mod: LT

## 2025-06-26 ASSESSMENT — PAIN SCALES - GENERAL: PAINLEVEL_OUTOF10: 0-NO PAIN

## 2025-06-26 NOTE — PATIENT INSTRUCTIONS
Your clinical examination and imaging are normal. You no longer need to be seen by a breast specialist for an annual physical breast examination. It is important to continue annual screening mammograms and breast exams through your primary care provider. Please return to see me if you have a new breast problem or abnormal mammogram. It has been a pleasure having you as a patient.     You can see your health information, review clinical summaries from office visits & test results online when you follow your health with MY  Chart, a personal health record. To sign up go to [email or web address]. If you need assistance with signing up or trouble getting into your account call [facility] Patient Line 24/7 at [phone].    My office phone number is [phone] if you need to get in touch with me or have additional questions or concerns. Thank you for choosing [facility] and trusting me as your healthcare provider. I am honored to be a provider on your health care team and I remain dedicated to helping you achieve your health goals.